# Patient Record
Sex: FEMALE | Race: BLACK OR AFRICAN AMERICAN | NOT HISPANIC OR LATINO | Employment: PART TIME | URBAN - METROPOLITAN AREA
[De-identification: names, ages, dates, MRNs, and addresses within clinical notes are randomized per-mention and may not be internally consistent; named-entity substitution may affect disease eponyms.]

---

## 2018-01-03 ENCOUNTER — NON-PROVIDER VISIT (OUTPATIENT)
Dept: URGENT CARE | Facility: PHYSICIAN GROUP | Age: 23
End: 2018-01-03

## 2018-01-03 DIAGNOSIS — Z11.1 SPECIAL SCREENING EXAMINATION FOR RESPIRATORY TUBERCULOSIS: ICD-10-CM

## 2018-01-03 PROCEDURE — 86580 TB INTRADERMAL TEST: CPT | Performed by: NURSE PRACTITIONER

## 2018-01-05 ENCOUNTER — NON-PROVIDER VISIT (OUTPATIENT)
Dept: URGENT CARE | Facility: PHYSICIAN GROUP | Age: 23
End: 2018-01-05

## 2018-01-05 LAB — TB WHEAL 3D P 5 TU DIAM: NORMAL MM

## 2018-01-06 NOTE — NON-PROVIDER
Tabatha Ruffin is a 22 y.o. female here for a non-provider visit for PPD reading -- Step 1 of 2.      1.  Resulted in Epic under enter/edit results? Yes   2.  TB evaluation questionnaire scanned into chart and original given to patient?No      3. Was induration greater than 0 mm? Yes. Provider notified. Patient will return for step two.    If Step 1 of 2, when is patient returning for second step (delete if N/A): 1 week    Routed to PCP? No

## 2018-01-10 ENCOUNTER — NON-PROVIDER VISIT (OUTPATIENT)
Dept: URGENT CARE | Facility: PHYSICIAN GROUP | Age: 23
End: 2018-01-10

## 2018-01-10 DIAGNOSIS — Z11.1 PPD SCREENING TEST: ICD-10-CM

## 2018-01-10 PROCEDURE — 86580 TB INTRADERMAL TEST: CPT | Performed by: EMERGENCY MEDICINE

## 2018-01-11 NOTE — NON-PROVIDER
Tabatha Ruffin is a 22 y.o. female here for a non-provider visit for PPD placement -- Step 2 of 2    Reason for PPD:  work requirement    1. TB evaluation questionnaire completed by patient? Yes      -  If any answers marked yes did you contact a provider prior to placing? Not Indicated  2.  Patient notified to return to clinic for reading on: 1/12-1/13  3.  PPD Placement documentation completed on TB evaluation questionnaire? Yes  4.  Location of TB evaluation questionnaire filed: front office folder

## 2018-01-12 ENCOUNTER — NON-PROVIDER VISIT (OUTPATIENT)
Dept: URGENT CARE | Facility: PHYSICIAN GROUP | Age: 23
End: 2018-01-12

## 2018-01-12 LAB — TB WHEAL 3D P 5 TU DIAM: NORMAL MM

## 2018-01-13 NOTE — NON-PROVIDER
Tabatha Ruffin is a 22 y.o. female here for a non-provider visit for PPD reading -- Step 2 of 2.      1.  Resulted in Epic under enter/edit results? Yes   2.  TB evaluation questionnaire scanned into chart and original given to patient?Yes      3. Was induration greater than 0 mm? No.  0mm result was result was verified by Beto Gorman.    Routed to PCP? No